# Patient Record
Sex: FEMALE | Race: WHITE | HISPANIC OR LATINO | URBAN - METROPOLITAN AREA
[De-identification: names, ages, dates, MRNs, and addresses within clinical notes are randomized per-mention and may not be internally consistent; named-entity substitution may affect disease eponyms.]

---

## 2018-03-30 ENCOUNTER — EMERGENCY (EMERGENCY)
Facility: HOSPITAL | Age: 23
LOS: 1 days | Discharge: HOME | End: 2018-03-30
Attending: EMERGENCY MEDICINE
Payer: MEDICAID

## 2018-03-30 VITALS
TEMPERATURE: 99 F | OXYGEN SATURATION: 100 % | HEART RATE: 101 BPM | SYSTOLIC BLOOD PRESSURE: 102 MMHG | RESPIRATION RATE: 16 BRPM | DIASTOLIC BLOOD PRESSURE: 69 MMHG

## 2018-03-30 PROCEDURE — 99283 EMERGENCY DEPT VISIT LOW MDM: CPT

## 2018-03-30 PROCEDURE — 99284 EMERGENCY DEPT VISIT MOD MDM: CPT

## 2018-03-30 RX ORDER — ACETAMINOPHEN 500 MG
650 TABLET ORAL ONCE
Qty: 0 | Refills: 0 | Status: COMPLETED | OUTPATIENT
Start: 2018-03-30 | End: 2018-03-30

## 2018-03-30 RX ADMIN — Medication 650 MILLIGRAM(S): at 15:22

## 2018-03-30 NOTE — ED PROVIDER NOTE - MEDICAL DECISION MAKING DETAILS
22 y/o F here w/ throat pain, exam is not concerning for bacterial infection. Pt advised that Tylenol is safe in pregnancy, instructed to stay hydrated.

## 2018-03-30 NOTE — ED PROVIDER NOTE - OBJECTIVE STATEMENT
22 y/o F with no significant PMHx presents to ED c/o throat pain, headache and body aches x yesterday night. Pt has not taken any medication for her symptoms, states that she is 11 weeks pregnant. Denies any abd pain, fever, or any other complaints. NKDA.

## 2018-07-11 ENCOUNTER — OUTPATIENT (OUTPATIENT)
Dept: OUTPATIENT SERVICES | Facility: HOSPITAL | Age: 23
LOS: 1 days | End: 2018-07-11
Payer: MEDICAID

## 2018-07-11 DIAGNOSIS — O26.899 OTHER SPECIFIED PREGNANCY RELATED CONDITIONS, UNSPECIFIED TRIMESTER: ICD-10-CM

## 2018-07-11 DIAGNOSIS — Z3A.00 WEEKS OF GESTATION OF PREGNANCY NOT SPECIFIED: ICD-10-CM

## 2018-07-11 LAB
APPEARANCE UR: CLEAR — SIGNIFICANT CHANGE UP
APTT BLD: 25.5 SEC — LOW (ref 27.5–37.4)
BASOPHILS # BLD AUTO: 0.1 K/UL — SIGNIFICANT CHANGE UP (ref 0–0.2)
BASOPHILS NFR BLD AUTO: 0.4 % — SIGNIFICANT CHANGE UP (ref 0–2)
BILIRUB UR-MCNC: NEGATIVE — SIGNIFICANT CHANGE UP
COLOR SPEC: YELLOW — SIGNIFICANT CHANGE UP
DIFF PNL FLD: ABNORMAL
EOSINOPHIL # BLD AUTO: 0 K/UL — SIGNIFICANT CHANGE UP (ref 0–0.5)
EOSINOPHIL NFR BLD AUTO: 0.2 % — SIGNIFICANT CHANGE UP (ref 0–6)
FIBRINOGEN PPP-MCNC: 642 MG/DL — HIGH (ref 310–510)
GLUCOSE UR QL: NEGATIVE — SIGNIFICANT CHANGE UP
HCT VFR BLD CALC: 34.3 % — LOW (ref 34.5–45)
HGB BLD-MCNC: 11.3 G/DL — LOW (ref 11.5–15.5)
INR BLD: 0.97 RATIO — SIGNIFICANT CHANGE UP (ref 0.88–1.16)
KETONES UR-MCNC: NEGATIVE — SIGNIFICANT CHANGE UP
KLEIHAUER-BETKE CALCULATION: 0.1 % — SIGNIFICANT CHANGE UP (ref 0–0.3)
LEUKOCYTE ESTERASE UR-ACNC: ABNORMAL
LYMPHOCYTES # BLD AUTO: 1.5 K/UL — SIGNIFICANT CHANGE UP (ref 1–3.3)
LYMPHOCYTES # BLD AUTO: 10.6 % — LOW (ref 13–44)
MCHC RBC-ENTMCNC: 28.6 PG — SIGNIFICANT CHANGE UP (ref 27–34)
MCHC RBC-ENTMCNC: 32.9 GM/DL — SIGNIFICANT CHANGE UP (ref 32–36)
MCV RBC AUTO: 87 FL — SIGNIFICANT CHANGE UP (ref 80–100)
MONOCYTES # BLD AUTO: 1.1 K/UL — HIGH (ref 0–0.9)
MONOCYTES NFR BLD AUTO: 8.1 % — SIGNIFICANT CHANGE UP (ref 2–14)
NEUTROPHILS # BLD AUTO: 11.5 K/UL — HIGH (ref 1.8–7.4)
NEUTROPHILS NFR BLD AUTO: 80.7 % — HIGH (ref 43–77)
NITRITE UR-MCNC: NEGATIVE — SIGNIFICANT CHANGE UP
PH UR: 6 — SIGNIFICANT CHANGE UP (ref 5–8)
PLATELET # BLD AUTO: 234 K/UL — SIGNIFICANT CHANGE UP (ref 150–400)
PROT UR-MCNC: 30 MG/DL
PROTHROM AB SERPL-ACNC: 10.6 SEC — SIGNIFICANT CHANGE UP (ref 9.8–12.7)
RBC # BLD: 3.94 M/UL — SIGNIFICANT CHANGE UP (ref 3.8–5.2)
RBC # FLD: 14.2 % — SIGNIFICANT CHANGE UP (ref 10.3–14.5)
SP GR SPEC: 1.02 — SIGNIFICANT CHANGE UP (ref 1.01–1.02)
UROBILINOGEN FLD QL: NEGATIVE — SIGNIFICANT CHANGE UP
WBC # BLD: 14.2 K/UL — HIGH (ref 3.8–10.5)
WBC # FLD AUTO: 14.2 K/UL — HIGH (ref 3.8–10.5)

## 2018-07-11 PROCEDURE — 85610 PROTHROMBIN TIME: CPT

## 2018-07-11 PROCEDURE — 76815 OB US LIMITED FETUS(S): CPT | Mod: 26

## 2018-07-11 PROCEDURE — 76817 TRANSVAGINAL US OBSTETRIC: CPT | Mod: 26

## 2018-07-11 PROCEDURE — 86901 BLOOD TYPING SEROLOGIC RH(D): CPT

## 2018-07-11 PROCEDURE — 59025 FETAL NON-STRESS TEST: CPT

## 2018-07-11 PROCEDURE — 76817 TRANSVAGINAL US OBSTETRIC: CPT

## 2018-07-11 PROCEDURE — 76815 OB US LIMITED FETUS(S): CPT

## 2018-07-11 PROCEDURE — 86900 BLOOD TYPING SEROLOGIC ABO: CPT

## 2018-07-11 PROCEDURE — 85384 FIBRINOGEN ACTIVITY: CPT

## 2018-07-11 PROCEDURE — G0463: CPT

## 2018-07-11 PROCEDURE — 81001 URINALYSIS AUTO W/SCOPE: CPT

## 2018-07-11 PROCEDURE — 85730 THROMBOPLASTIN TIME PARTIAL: CPT

## 2018-07-11 PROCEDURE — 85027 COMPLETE CBC AUTOMATED: CPT

## 2018-07-11 PROCEDURE — 85460 HEMOGLOBIN FETAL: CPT

## 2018-07-11 PROCEDURE — 86850 RBC ANTIBODY SCREEN: CPT

## 2018-10-07 ENCOUNTER — INPATIENT (INPATIENT)
Facility: HOSPITAL | Age: 23
LOS: 2 days | Discharge: HOME | End: 2018-10-10
Attending: OBSTETRICS & GYNECOLOGY | Admitting: OBSTETRICS & GYNECOLOGY
Payer: MEDICAID

## 2018-10-07 DIAGNOSIS — O26.899 OTHER SPECIFIED PREGNANCY RELATED CONDITIONS, UNSPECIFIED TRIMESTER: ICD-10-CM

## 2018-10-07 DIAGNOSIS — Z3A.00 WEEKS OF GESTATION OF PREGNANCY NOT SPECIFIED: ICD-10-CM

## 2018-10-07 DIAGNOSIS — Z34.80 ENCOUNTER FOR SUPERVISION OF OTHER NORMAL PREGNANCY, UNSPECIFIED TRIMESTER: ICD-10-CM

## 2018-10-07 LAB
APTT BLD: 26.7 SEC — LOW (ref 27.5–37.4)
BASOPHILS # BLD AUTO: 0.1 K/UL — SIGNIFICANT CHANGE UP (ref 0–0.2)
BASOPHILS NFR BLD AUTO: 0.7 % — SIGNIFICANT CHANGE UP (ref 0–2)
EOSINOPHIL # BLD AUTO: 0 K/UL — SIGNIFICANT CHANGE UP (ref 0–0.5)
EOSINOPHIL NFR BLD AUTO: 0.1 % — SIGNIFICANT CHANGE UP (ref 0–6)
FIBRINOGEN PPP-MCNC: 621 MG/DL — HIGH (ref 310–510)
HCT VFR BLD CALC: 36.7 % — SIGNIFICANT CHANGE UP (ref 34.5–45)
HGB BLD-MCNC: 11.8 G/DL — SIGNIFICANT CHANGE UP (ref 11.5–15.5)
INR BLD: 0.87 RATIO — LOW (ref 0.88–1.16)
LYMPHOCYTES # BLD AUTO: 2.5 K/UL — SIGNIFICANT CHANGE UP (ref 1–3.3)
LYMPHOCYTES # BLD AUTO: 20.4 % — SIGNIFICANT CHANGE UP (ref 13–44)
MCHC RBC-ENTMCNC: 26.3 PG — LOW (ref 27–34)
MCHC RBC-ENTMCNC: 32.2 GM/DL — SIGNIFICANT CHANGE UP (ref 32–36)
MCV RBC AUTO: 81.6 FL — SIGNIFICANT CHANGE UP (ref 80–100)
MONOCYTES # BLD AUTO: 0.9 K/UL — SIGNIFICANT CHANGE UP (ref 0–0.9)
MONOCYTES NFR BLD AUTO: 7.6 % — SIGNIFICANT CHANGE UP (ref 2–14)
NEUTROPHILS # BLD AUTO: 8.6 K/UL — HIGH (ref 1.8–7.4)
NEUTROPHILS NFR BLD AUTO: 71.1 % — SIGNIFICANT CHANGE UP (ref 43–77)
PLATELET # BLD AUTO: 297 K/UL — SIGNIFICANT CHANGE UP (ref 150–400)
PROTHROM AB SERPL-ACNC: 9.5 SEC — LOW (ref 9.8–12.7)
RBC # BLD: 4.5 M/UL — SIGNIFICANT CHANGE UP (ref 3.8–5.2)
RBC # FLD: 14.6 % — HIGH (ref 10.3–14.5)
WBC # BLD: 12.1 K/UL — HIGH (ref 3.8–10.5)
WBC # FLD AUTO: 12.1 K/UL — HIGH (ref 3.8–10.5)

## 2018-10-07 PROCEDURE — 99283 EMERGENCY DEPT VISIT LOW MDM: CPT

## 2018-10-07 RX ORDER — SODIUM CHLORIDE 9 MG/ML
1000 INJECTION, SOLUTION INTRAVENOUS ONCE
Qty: 0 | Refills: 0 | Status: COMPLETED | OUTPATIENT
Start: 2018-10-07 | End: 2018-10-07

## 2018-10-07 RX ORDER — OXYTOCIN 10 UNIT/ML
2 VIAL (ML) INJECTION
Qty: 30 | Refills: 0 | Status: DISCONTINUED | OUTPATIENT
Start: 2018-10-07 | End: 2018-10-10

## 2018-10-07 RX ORDER — CITRIC ACID/SODIUM CITRATE 300-500 MG
15 SOLUTION, ORAL ORAL EVERY 4 HOURS
Qty: 0 | Refills: 0 | Status: DISCONTINUED | OUTPATIENT
Start: 2018-10-07 | End: 2018-10-08

## 2018-10-07 RX ORDER — OXYTOCIN 10 UNIT/ML
333.33 VIAL (ML) INJECTION
Qty: 20 | Refills: 0 | Status: DISCONTINUED | OUTPATIENT
Start: 2018-10-07 | End: 2018-10-08

## 2018-10-07 RX ORDER — SODIUM CHLORIDE 9 MG/ML
1000 INJECTION, SOLUTION INTRAVENOUS
Qty: 0 | Refills: 0 | Status: DISCONTINUED | OUTPATIENT
Start: 2018-10-07 | End: 2018-10-08

## 2018-10-07 RX ADMIN — Medication 2 MILLIUNIT(S)/MIN: at 19:23

## 2018-10-07 RX ADMIN — SODIUM CHLORIDE 125 MILLILITER(S): 9 INJECTION, SOLUTION INTRAVENOUS at 20:30

## 2018-10-07 RX ADMIN — SODIUM CHLORIDE 2000 MILLILITER(S): 9 INJECTION, SOLUTION INTRAVENOUS at 19:24

## 2018-10-07 NOTE — PATIENT PROFILE OB - BREAST MILK PROVIDES PROTECTION AGAINST INFECTION
Delivery Summary  Patient: Michael Vincent Son             Circumcision:   desires  Additional Delivery Comments - Pt pushed x 5 contractions with prolonged fetal deceleration at +5 station. Decision made to apply flat kiwi vacuum. Vacuum applied and over 1 pull and 1 contractions without pop-offs fetal head was delivered in ERNIE position and compound left hand - hand unable to be easily released, no nuchal cord, gentle guidance with easy shoulder and delivery remainder of infant's body. Baby to mother's abdomen and bulb suctioned. Cord clamping delayed x 2 min. Spontaneous placenta after 5 mins. Midline 1st degree laceration repaired with 3-0 vicryl and 1cm superificial although bleeding laceration just inferior to clitoris repaired with 3-0 vicryl in figure of eight stitch. Fundus firm and hemostasis excellent. Bladder drained with straight cath. Information for the patient's :  Capri Thompson [[id number]]     Delivery Type: Vaginal, Vacuum (Extractor)   Delivery Date: 2017   Delivery Time: 10:04 AM     Birth Weight:       Sex:  male  Delivery Clinician:  Maria R PACHECO   Gestational Age: Gestational Age: 44w3d    Presentation: Vertex   Position:             Apgars were 9  and 9      Resuscitation Method: Suctioning-bulb; Tactile Stimulation     Meconium Stained: None  Living Status: Living       Placenta Date/Time:  10:08 AM   Placenta Removal: Spontaneous   Placenta Appearance: Vertex [1]     Cord Information: 3 Vessels    Cord Events: None       Cord Blood Sent?:  Yes    Blood Gases Sent?:  No       Cord pH:  none    Episiotomy: None   Laceration(s): First degree perineal     Estimated Blood Loss (ml): 200    Labor Events  Method:      Augmentation: None   Cervical Ripening:       None        Operative Vaginal Delivery - Consent & Procedure:  The indications, risks, and benefits of operative vaginal delivery compared to  delivery were discussed with the patient and attendant family member. All questions were answered. Bladder was drained prior to instrumentation. Description of procedure: see above. Statement Selected

## 2018-10-08 VITALS — DIASTOLIC BLOOD PRESSURE: 59 MMHG | HEART RATE: 137 BPM | SYSTOLIC BLOOD PRESSURE: 113 MMHG

## 2018-10-08 LAB
HCT VFR BLD CALC: 28.9 % — LOW (ref 34.5–45)
HGB BLD-MCNC: 9.4 G/DL — LOW (ref 11.5–15.5)
LYMPHOCYTES # BLD AUTO: 12 % — LOW (ref 13–44)
MCHC RBC-ENTMCNC: 26.2 PG — LOW (ref 27–34)
MCHC RBC-ENTMCNC: 32.6 GM/DL — SIGNIFICANT CHANGE UP (ref 32–36)
MCV RBC AUTO: 80.5 FL — SIGNIFICANT CHANGE UP (ref 80–100)
MONOCYTES NFR BLD AUTO: 8 % — SIGNIFICANT CHANGE UP (ref 2–14)
NEUTROPHILS NFR BLD AUTO: 80 % — HIGH (ref 43–77)
PLATELET # BLD AUTO: 216 K/UL — SIGNIFICANT CHANGE UP (ref 150–400)
RBC # BLD: 3.59 M/UL — LOW (ref 3.8–5.2)
RBC # FLD: 14.6 % — HIGH (ref 10.3–14.5)
T PALLIDUM AB TITR SER: NEGATIVE — SIGNIFICANT CHANGE UP
WBC # BLD: 21.5 K/UL — HIGH (ref 3.8–10.5)
WBC # FLD AUTO: 21.5 K/UL — HIGH (ref 3.8–10.5)

## 2018-10-08 RX ORDER — MAGNESIUM HYDROXIDE 400 MG/1
30 TABLET, CHEWABLE ORAL
Qty: 0 | Refills: 0 | Status: DISCONTINUED | OUTPATIENT
Start: 2018-10-08 | End: 2018-10-10

## 2018-10-08 RX ORDER — TETANUS TOXOID, REDUCED DIPHTHERIA TOXOID AND ACELLULAR PERTUSSIS VACCINE, ADSORBED 5; 2.5; 8; 8; 2.5 [IU]/.5ML; [IU]/.5ML; UG/.5ML; UG/.5ML; UG/.5ML
0.5 SUSPENSION INTRAMUSCULAR ONCE
Qty: 0 | Refills: 0 | Status: DISCONTINUED | OUTPATIENT
Start: 2018-10-08 | End: 2018-10-10

## 2018-10-08 RX ORDER — FERROUS SULFATE 325(65) MG
325 TABLET ORAL DAILY
Qty: 0 | Refills: 0 | Status: DISCONTINUED | OUTPATIENT
Start: 2018-10-08 | End: 2018-10-10

## 2018-10-08 RX ORDER — SIMETHICONE 80 MG/1
80 TABLET, CHEWABLE ORAL EVERY 6 HOURS
Qty: 0 | Refills: 0 | Status: DISCONTINUED | OUTPATIENT
Start: 2018-10-08 | End: 2018-10-10

## 2018-10-08 RX ORDER — BENZOCAINE 10 %
1 GEL (GRAM) MUCOUS MEMBRANE EVERY 6 HOURS
Qty: 0 | Refills: 0 | Status: DISCONTINUED | OUTPATIENT
Start: 2018-10-08 | End: 2018-10-10

## 2018-10-08 RX ORDER — LANOLIN
1 OINTMENT (GRAM) TOPICAL EVERY 6 HOURS
Qty: 0 | Refills: 0 | Status: DISCONTINUED | OUTPATIENT
Start: 2018-10-08 | End: 2018-10-10

## 2018-10-08 RX ORDER — IBUPROFEN 200 MG
600 TABLET ORAL EVERY 6 HOURS
Qty: 0 | Refills: 0 | Status: DISCONTINUED | OUTPATIENT
Start: 2018-10-08 | End: 2018-10-10

## 2018-10-08 RX ORDER — OXYCODONE AND ACETAMINOPHEN 5; 325 MG/1; MG/1
1 TABLET ORAL
Qty: 0 | Refills: 0 | Status: DISCONTINUED | OUTPATIENT
Start: 2018-10-08 | End: 2018-10-10

## 2018-10-08 RX ORDER — ACETAMINOPHEN 500 MG
650 TABLET ORAL EVERY 6 HOURS
Qty: 0 | Refills: 0 | Status: DISCONTINUED | OUTPATIENT
Start: 2018-10-08 | End: 2018-10-10

## 2018-10-08 RX ORDER — HYDROCORTISONE 1 %
1 OINTMENT (GRAM) TOPICAL EVERY 4 HOURS
Qty: 0 | Refills: 0 | Status: DISCONTINUED | OUTPATIENT
Start: 2018-10-08 | End: 2018-10-10

## 2018-10-08 RX ORDER — PRAMOXINE HYDROCHLORIDE 150 MG/15G
1 AEROSOL, FOAM RECTAL EVERY 4 HOURS
Qty: 0 | Refills: 0 | Status: DISCONTINUED | OUTPATIENT
Start: 2018-10-08 | End: 2018-10-10

## 2018-10-08 RX ORDER — DIBUCAINE 1 %
1 OINTMENT (GRAM) RECTAL EVERY 4 HOURS
Qty: 0 | Refills: 0 | Status: DISCONTINUED | OUTPATIENT
Start: 2018-10-08 | End: 2018-10-10

## 2018-10-08 RX ORDER — SODIUM CHLORIDE 9 MG/ML
3 INJECTION INTRAMUSCULAR; INTRAVENOUS; SUBCUTANEOUS EVERY 8 HOURS
Qty: 0 | Refills: 0 | Status: DISCONTINUED | OUTPATIENT
Start: 2018-10-08 | End: 2018-10-10

## 2018-10-08 RX ORDER — OXYTOCIN 10 UNIT/ML
41.67 VIAL (ML) INJECTION
Qty: 20 | Refills: 0 | Status: DISCONTINUED | OUTPATIENT
Start: 2018-10-08 | End: 2018-10-10

## 2018-10-08 RX ORDER — AER TRAVELER 0.5 G/1
1 SOLUTION RECTAL; TOPICAL EVERY 4 HOURS
Qty: 0 | Refills: 0 | Status: DISCONTINUED | OUTPATIENT
Start: 2018-10-08 | End: 2018-10-10

## 2018-10-08 RX ORDER — DIPHENHYDRAMINE HCL 50 MG
25 CAPSULE ORAL EVERY 6 HOURS
Qty: 0 | Refills: 0 | Status: DISCONTINUED | OUTPATIENT
Start: 2018-10-08 | End: 2018-10-10

## 2018-10-08 RX ORDER — DOCUSATE SODIUM 100 MG
100 CAPSULE ORAL
Qty: 0 | Refills: 0 | Status: DISCONTINUED | OUTPATIENT
Start: 2018-10-08 | End: 2018-10-10

## 2018-10-08 RX ORDER — OXYCODONE AND ACETAMINOPHEN 5; 325 MG/1; MG/1
2 TABLET ORAL EVERY 6 HOURS
Qty: 0 | Refills: 0 | Status: DISCONTINUED | OUTPATIENT
Start: 2018-10-08 | End: 2018-10-10

## 2018-10-08 RX ADMIN — OXYCODONE AND ACETAMINOPHEN 1 TABLET(S): 5; 325 TABLET ORAL at 10:39

## 2018-10-08 RX ADMIN — Medication 1 TABLET(S): at 12:02

## 2018-10-08 RX ADMIN — SODIUM CHLORIDE 3 MILLILITER(S): 9 INJECTION INTRAMUSCULAR; INTRAVENOUS; SUBCUTANEOUS at 22:07

## 2018-10-08 RX ADMIN — Medication 325 MILLIGRAM(S): at 12:02

## 2018-10-08 RX ADMIN — Medication 125 MILLIUNIT(S)/MIN: at 05:30

## 2018-10-08 RX ADMIN — OXYCODONE AND ACETAMINOPHEN 1 TABLET(S): 5; 325 TABLET ORAL at 11:00

## 2018-10-08 RX ADMIN — SODIUM CHLORIDE 3 MILLILITER(S): 9 INJECTION INTRAMUSCULAR; INTRAVENOUS; SUBCUTANEOUS at 14:00

## 2018-10-09 LAB
BASOPHILS # BLD AUTO: 0.1 K/UL — SIGNIFICANT CHANGE UP (ref 0–0.2)
BASOPHILS NFR BLD AUTO: 0.3 % — SIGNIFICANT CHANGE UP (ref 0–2)
EOSINOPHIL # BLD AUTO: 0.1 K/UL — SIGNIFICANT CHANGE UP (ref 0–0.5)
EOSINOPHIL NFR BLD AUTO: 0.4 % — SIGNIFICANT CHANGE UP (ref 0–6)
HCT VFR BLD CALC: 29.4 % — LOW (ref 34.5–45)
HGB BLD-MCNC: 9.5 G/DL — LOW (ref 11.5–15.5)
LYMPHOCYTES # BLD AUTO: 15.3 % — SIGNIFICANT CHANGE UP (ref 13–44)
LYMPHOCYTES # BLD AUTO: 3 K/UL — SIGNIFICANT CHANGE UP (ref 1–3.3)
MCHC RBC-ENTMCNC: 26.4 PG — LOW (ref 27–34)
MCHC RBC-ENTMCNC: 32.2 GM/DL — SIGNIFICANT CHANGE UP (ref 32–36)
MCV RBC AUTO: 82 FL — SIGNIFICANT CHANGE UP (ref 80–100)
MONOCYTES # BLD AUTO: 1.5 K/UL — HIGH (ref 0–0.9)
MONOCYTES NFR BLD AUTO: 7.7 % — SIGNIFICANT CHANGE UP (ref 2–14)
NEUTROPHILS # BLD AUTO: 14.9 K/UL — HIGH (ref 1.8–7.4)
NEUTROPHILS NFR BLD AUTO: 76.2 % — SIGNIFICANT CHANGE UP (ref 43–77)
PLATELET # BLD AUTO: 211 K/UL — SIGNIFICANT CHANGE UP (ref 150–400)
RBC # BLD: 3.59 M/UL — LOW (ref 3.8–5.2)
RBC # FLD: 15 % — HIGH (ref 10.3–14.5)
WBC # BLD: 19.5 K/UL — HIGH (ref 3.8–10.5)
WBC # FLD AUTO: 19.5 K/UL — HIGH (ref 3.8–10.5)

## 2018-10-09 RX ADMIN — SODIUM CHLORIDE 3 MILLILITER(S): 9 INJECTION INTRAMUSCULAR; INTRAVENOUS; SUBCUTANEOUS at 13:38

## 2018-10-09 RX ADMIN — Medication 100 MILLIGRAM(S): at 17:11

## 2018-10-09 RX ADMIN — Medication 100 MILLIGRAM(S): at 07:36

## 2018-10-09 RX ADMIN — Medication 600 MILLIGRAM(S): at 17:11

## 2018-10-09 RX ADMIN — Medication 325 MILLIGRAM(S): at 12:20

## 2018-10-09 RX ADMIN — Medication 1 TABLET(S): at 12:20

## 2018-10-09 RX ADMIN — SODIUM CHLORIDE 3 MILLILITER(S): 9 INJECTION INTRAMUSCULAR; INTRAVENOUS; SUBCUTANEOUS at 06:44

## 2018-10-09 RX ADMIN — Medication 600 MILLIGRAM(S): at 08:10

## 2018-10-09 RX ADMIN — Medication 1 APPLICATION(S): at 12:20

## 2018-10-09 RX ADMIN — Medication 600 MILLIGRAM(S): at 07:36

## 2018-10-09 NOTE — PROGRESS NOTE ADULT - SUBJECTIVE AND OBJECTIVE BOX
OBGYN PA NOTE PPD1  Patient seen and evaluated at bedside,  resting comfortably w/o any acute  complaints.  Denies fever, HA, CP, SOB, N/V/D, dizziness, palpitations, worsening abdominal pain, worsening vaginal bleeding, or any other concerns.  Ambulating and voiding without difficulty.  Passing flatus and tolerating regular diet.  Attempting to breastfeed.     Vital Signs Last 24 Hrs  T(C): 36.9 (09 Oct 2018 04:10), Max: 37.5 (08 Oct 2018 14:09)  T(F): 98.4 (09 Oct 2018 04:10), Max: 99.5 (08 Oct 2018 14:09)  HR: 93 (09 Oct 2018 04:10) (93 - 102)  BP: 111/70 (09 Oct 2018 04:10) (103/65 - 115/71)  BP(mean): --  RR: 17 (09 Oct 2018 04:10) (16 - 18)  SpO2: 99% (09 Oct 2018 04:10) (98% - 99%)    Physical Exam:     Gen: A&Ox 3, NAD  Chest: CTAB/L  Cardiac: S1+S2+ RRR  Breast: Soft, nontender, nonengorged  Abdomen: Soft, nontender, Fundus firm below umbilicus, +BS  Gyn: Mild lochia, intact/repaired  Extremities: Nontender, DTRS 2+, no worsening edema                          9.5    19.5  )-----------( 211      ( 09 Oct 2018 06:39 )             29.4       A/P:   24 yo F Postpartum day one s/p  @ 38.3 weeks, prom, currently in stable condition  -Continue pain management  -Encourage OOB and ambulation  -CBC in am  -ferrous sulfate  -Continue current care  -Plan for discharge tomorrow.     Dr Hooper aware

## 2018-10-09 NOTE — PROGRESS NOTE ADULT - ASSESSMENT
A/P:   24 yo F Postpartum day one s/p  @ 38.3 weeks, prom, currently in stable condition  -Continue pain management  -Encourage OOB and ambulation  -CBC in am  -ferrous sulfate  -Continue current care  -Plan for discharge tomorrow.

## 2018-10-10 VITALS
OXYGEN SATURATION: 99 % | HEART RATE: 73 BPM | TEMPERATURE: 98 F | SYSTOLIC BLOOD PRESSURE: 104 MMHG | DIASTOLIC BLOOD PRESSURE: 69 MMHG | RESPIRATION RATE: 17 BRPM

## 2018-10-10 DIAGNOSIS — D64.9 ANEMIA, UNSPECIFIED: ICD-10-CM

## 2018-10-10 LAB
BASOPHILS # BLD AUTO: 0.1 K/UL — SIGNIFICANT CHANGE UP (ref 0–0.2)
BASOPHILS NFR BLD AUTO: 0.4 % — SIGNIFICANT CHANGE UP (ref 0–2)
EOSINOPHIL # BLD AUTO: 0.1 K/UL — SIGNIFICANT CHANGE UP (ref 0–0.5)
EOSINOPHIL NFR BLD AUTO: 0.7 % — SIGNIFICANT CHANGE UP (ref 0–6)
HCT VFR BLD CALC: 31.4 % — LOW (ref 34.5–45)
HGB BLD-MCNC: 9.9 G/DL — LOW (ref 11.5–15.5)
LYMPHOCYTES # BLD AUTO: 11.6 % — LOW (ref 13–44)
LYMPHOCYTES # BLD AUTO: 2.4 K/UL — SIGNIFICANT CHANGE UP (ref 1–3.3)
MCHC RBC-ENTMCNC: 25.8 PG — LOW (ref 27–34)
MCHC RBC-ENTMCNC: 31.5 GM/DL — LOW (ref 32–36)
MCV RBC AUTO: 82 FL — SIGNIFICANT CHANGE UP (ref 80–100)
MONOCYTES # BLD AUTO: 1.3 K/UL — HIGH (ref 0–0.9)
MONOCYTES NFR BLD AUTO: 6.6 % — SIGNIFICANT CHANGE UP (ref 2–14)
NEUTROPHILS # BLD AUTO: 16.5 K/UL — HIGH (ref 1.8–7.4)
NEUTROPHILS NFR BLD AUTO: 80.6 % — HIGH (ref 43–77)
PLATELET # BLD AUTO: 239 K/UL — SIGNIFICANT CHANGE UP (ref 150–400)
RBC # BLD: 3.84 M/UL — SIGNIFICANT CHANGE UP (ref 3.8–5.2)
RBC # FLD: 15.4 % — HIGH (ref 10.3–14.5)
WBC # BLD: 20.4 K/UL — HIGH (ref 3.8–10.5)
WBC # FLD AUTO: 20.4 K/UL — HIGH (ref 3.8–10.5)

## 2018-10-10 PROCEDURE — 86901 BLOOD TYPING SEROLOGIC RH(D): CPT

## 2018-10-10 PROCEDURE — 59025 FETAL NON-STRESS TEST: CPT

## 2018-10-10 PROCEDURE — 36415 COLL VENOUS BLD VENIPUNCTURE: CPT

## 2018-10-10 PROCEDURE — 86850 RBC ANTIBODY SCREEN: CPT

## 2018-10-10 PROCEDURE — 85610 PROTHROMBIN TIME: CPT

## 2018-10-10 PROCEDURE — 85027 COMPLETE CBC AUTOMATED: CPT

## 2018-10-10 PROCEDURE — 59050 FETAL MONITOR W/REPORT: CPT

## 2018-10-10 PROCEDURE — 86900 BLOOD TYPING SEROLOGIC ABO: CPT

## 2018-10-10 PROCEDURE — G0463: CPT

## 2018-10-10 PROCEDURE — 85730 THROMBOPLASTIN TIME PARTIAL: CPT

## 2018-10-10 PROCEDURE — 86780 TREPONEMA PALLIDUM: CPT

## 2018-10-10 PROCEDURE — 85384 FIBRINOGEN ACTIVITY: CPT

## 2018-10-10 RX ORDER — IBUPROFEN 200 MG
1 TABLET ORAL
Qty: 40 | Refills: 0 | OUTPATIENT
Start: 2018-10-10 | End: 2018-10-19

## 2018-10-10 RX ORDER — DOCUSATE SODIUM 100 MG
1 CAPSULE ORAL
Qty: 60 | Refills: 0 | OUTPATIENT
Start: 2018-10-10 | End: 2018-11-08

## 2018-10-10 RX ORDER — FERROUS SULFATE 325(65) MG
1 TABLET ORAL
Qty: 60 | Refills: 0 | OUTPATIENT
Start: 2018-10-10 | End: 2018-11-08

## 2018-10-10 RX ORDER — SENNA PLUS 8.6 MG/1
2 TABLET ORAL
Qty: 60 | Refills: 0 | OUTPATIENT
Start: 2018-10-10 | End: 2018-11-08

## 2018-10-10 RX ADMIN — Medication 600 MILLIGRAM(S): at 06:24

## 2018-10-10 RX ADMIN — Medication 600 MILLIGRAM(S): at 06:58

## 2018-10-10 NOTE — DISCHARGE NOTE OB - CARE PROVIDER_API CALL
Eliel Hooper), Obstetrics and Gynecology  [address] [place] [address]  Phone: [phone]  Fax: [phone]

## 2018-10-10 NOTE — DISCHARGE NOTE OB - MATERIALS PROVIDED
Breastfeeding Guide and Packet/[facility] [place]  Immunization Record/Guide to Postpartum Care/Discharge Medication Information for Patients and Families Pocket Guide/[facility] Hearing Screen Program/Birth Certificate Instructions/[facility] [place] Screening Program/Vaccinations

## 2018-10-10 NOTE — PROGRESS NOTE ADULT - SUBJECTIVE AND OBJECTIVE BOX
PA NOTE:  PPD#2 s/p    in the room baby girl  pt offers no acute complaints  wbc trending down: afebrile/normotensive/no tachycardia  plans: breastfeeding  Denies HA, CP, SOB, N/V/D, dizziness, palpitations, worsening abdominal pain, worsening vaginal bleeding, or any other concerns.      Vital Signs Last 24 Hrs  T(C): 36.6 (10 Oct 2018 06:00), Max: 37.1 (09 Oct 2018 12:45)  T(F): 97.9 (10 Oct 2018 06:00), Max: 98.7 (09 Oct 2018 12:45)  HR: 73 (10 Oct 2018 06:00) (73 - 100)  BP: 104/69 (10 Oct 2018 06:00) (101/67 - 104/69)  BP(mean): --  RR: 17 (10 Oct 2018 06:00) (16 - 17)  SpO2: 99% (10 Oct 2018 06:00) (99% - 100%)    Physical Exam:     Gen: A&Ox 3, NAD  Chest: CTA B/L  Cardiac: S1+S2; RRR  Breast: Soft, nontender, nonengorged  Abdomen: +BS; Soft, nontender, ND; Fundus firm below umbilicus  Gyn: Min lochia  Ext: Nontender, DTRS 2+, no worsening edema                          9.9    20.4  )-----------( 239      ( 10 Oct 2018 07:00 )             31.4

## 2018-10-10 NOTE — DISCHARGE NOTE OB - ADDITIONAL INSTRUCTIONS
ob check in the office in 5weeks call to set up appointment  no sex nothing in vagina no heavy lifting no pushing no straining no strenuous activities  pain medication as needed; stool softener; dulcolax as needed if constipated  walk for exercise: helps recovery   continue prenatal vitamins daily especially whole course of breastfeeding  see your OB in the office for follow up post partum check 4-5weeks call the office to set up an appointment

## 2018-10-10 NOTE — DISCHARGE NOTE OB - PLAN OF CARE
ob check in the office in 5weeks call to set up appointment no sex nothing in vagina no heavy lifting no pushing no straining no strenuous activities  pain medication as needed; stool softener; dulcolax as needed if constipated  walk for exercise: helps recovery   continue prenatal vitamins daily especially whole course of breastfeeding  see your OB in the office for follow up post partum check 4-5weeks call the office to set up an appointment iron 2xday with sips of juice

## 2018-10-10 NOTE — PROGRESS NOTE ADULT - ASSESSMENT
PA NOTE:  PPD#2 s/p    in the room baby girl  pt offers no acute complaints  wbc trending down: afebrile/normotensive/no tachycardia  plans: breastfeeding  -dw dr chappell: dc home today  -erx sent  -attended dc class  -dc instructions verbalized

## 2018-10-10 NOTE — DISCHARGE NOTE OB - CARE PLAN
Principal Discharge DX:	Normal spontaneous vaginal delivery  Goal:	ob check in the office in 5weeks call to set up appointment  Assessment and plan of treatment:	no sex nothing in vagina no heavy lifting no pushing no straining no strenuous activities  pain medication as needed; stool softener; dulcolax as needed if constipated  walk for exercise: helps recovery   continue prenatal vitamins daily especially whole course of breastfeeding  see your OB in the office for follow up post partum check 4-5weeks call the office to set up an appointment  Secondary Diagnosis:	Mild anemia  Goal:	iron 2xday with sips of juice

## 2018-10-10 NOTE — DISCHARGE NOTE OB - MEDICATION SUMMARY - MEDICATIONS TO TAKE
I will START or STAY ON the medications listed below when I get home from the hospital:    ibuprofen 600 mg oral tablet  -- 1 tab(s) by mouth every 6 hours, As needed, breakthrough pain  -- Indication: For for pain    ferrous sulfate 325 mg (65 mg elemental iron) oral tablet  -- 1 tab(s) by mouth 2 times a day   -- Indication: For mild anemia    Prenatal Multivitamins with Folic Acid 1 mg oral tablet  -- 1 tab(s) by mouth once a day  -- Indication: For continue daily while whole course of breastfeeding    docusate sodium 100 mg oral capsule  -- 1 cap(s) by mouth 2 times a day -Stool Softening   -- Indication: For Stool softner    senna oral tablet  -- 2 tab(s) by mouth once a day   -- Indication: For for bowel movement

## 2018-10-10 NOTE — DISCHARGE NOTE OB - PATIENT PORTAL LINK FT
You can access the [facility] Patient Portal, offered by [facility], by registering with the following website: [email or web address]

## 2022-11-14 NOTE — ED PROVIDER NOTE - CARDIAC RHYTHM
HEART CATHETERIZATION/ANGIOGRAPHY DISCHARGE INSTRUCTIONS    Check puncture site frequently for swelling or bleeding. If there is any bleeding, apply pressure over the area with a clean towel or washcloth and call 911. Notify your doctor for any redness, swelling, drainage, or oozing from the puncture site. Notify your doctor for any fever or chills. If the extremity becomes cold, numb, or painful call [facility] at [phone]. Activity should be limited for the next 48 hours. No heavy lifting, pushing, pulling  or strenuous activity for 48 hours. No heavy lifting (anything over 10 pounds) for 3 days. You may resume your usual diet. Drink more fluids than usual.  Have a responsible person drive you home and stay with you for at least 24 hours after your heart catheterization/angiography. No driving for 24 hours. You may remove bandage from your Right Groin in 24 hours. You may shower in 24 hours. No tub baths, hot tubs, or swimming for 1 week. Do not place any lotions, creams, powders, or ointments over puncture site for 1 week. You may place a clean band-aid over the puncture site each day for 5 days. Change daily. Continue all medications as prescribed. Sedation for a Medical Procedure: Care Instructions     You were given a sedative medication during your visit. While many of the effects will have worn   off before you leave; you may continue to feel some effects for several hours. Common side effects from sedation include:  Feeling sleepy. (Your doctors and nurses will make sure you are not too sleepy to go home.)  Nausea and vomiting. This usually does not last long. Feeling tired. How can you care for yourself at home? Activity    Don't do anything for 24 hours that requires attention to detail. It takes time for the medicine effects to completely wear off. Do not make important legal decisions for 24 hours. Do not sign any legal documents for 24 hours.      Do not drink alcohol today     For your safety, you should not drive or operate heavy machinery for the remainder of the day     Rest when you feel tired. Getting enough sleep will help you recover. Diet    You can eat your normal diet, unless your doctor gives you other instructions. If your stomach is upset, try clear liquids and bland, low-fat foods like plain toast or rice. Drink plenty of fluids (unless your doctor tells you not to). Don't drink alcohol for 24 hours. Medicines    Be safe with medicines. Read and follow all instructions on the label. If the doctor gave you a prescription medicine for pain, take it as prescribed. If you are not taking a prescription pain medicine, ask your doctor if you can take an over-the-counter medicine. If you think your pain medicine is making you sick to your stomach: Take your medicine after meals (unless your doctor has told you not to). Ask your doctor for a different pain medicine. I have read the above instructions and have had the opportunity to ask questions.       Patient: ________________________   Date: _____________    Witness: _______________________   Date: _____________ regular
